# Patient Record
Sex: MALE | Race: BLACK OR AFRICAN AMERICAN | NOT HISPANIC OR LATINO
[De-identification: names, ages, dates, MRNs, and addresses within clinical notes are randomized per-mention and may not be internally consistent; named-entity substitution may affect disease eponyms.]

---

## 2019-10-14 ENCOUNTER — APPOINTMENT (OUTPATIENT)
Dept: THORACIC SURGERY | Facility: CLINIC | Age: 64
End: 2019-10-14
Payer: COMMERCIAL

## 2019-10-14 DIAGNOSIS — F17.290 NICOTINE DEPENDENCE, OTHER TOBACCO PRODUCT, UNCOMPLICATED: ICD-10-CM

## 2019-10-14 DIAGNOSIS — Z87.438 PERSONAL HISTORY OF OTHER DISEASES OF MALE GENITAL ORGANS: ICD-10-CM

## 2019-10-14 DIAGNOSIS — Z86.39 PERSONAL HISTORY OF OTHER ENDOCRINE, NUTRITIONAL AND METABOLIC DISEASE: ICD-10-CM

## 2019-10-14 PROCEDURE — 99204 OFFICE O/P NEW MOD 45 MIN: CPT

## 2019-10-15 VITALS
DIASTOLIC BLOOD PRESSURE: 80 MMHG | BODY MASS INDEX: 25.77 KG/M2 | WEIGHT: 180 LBS | HEIGHT: 70 IN | SYSTOLIC BLOOD PRESSURE: 130 MMHG

## 2019-10-15 PROBLEM — Z87.438 HISTORY OF BENIGN PROSTATIC HYPERPLASIA: Status: RESOLVED | Noted: 2019-10-15 | Resolved: 2019-10-15

## 2019-10-15 PROBLEM — Z86.39 HISTORY OF HYPOTHYROIDISM: Status: RESOLVED | Noted: 2019-10-15 | Resolved: 2019-10-15

## 2019-10-15 PROBLEM — F17.290 CIGAR SMOKER: Status: ACTIVE | Noted: 2019-10-15

## 2019-10-15 RX ORDER — TADALAFIL 5 MG/1
TABLET, FILM COATED ORAL
Refills: 0 | Status: ACTIVE | COMMUNITY

## 2019-10-15 RX ORDER — DUTASTERIDE AND TAMSULOSIN HYDROCHLORIDE .5; .4 MG/1; MG/1
0.5-0.4 CAPSULE ORAL
Refills: 0 | Status: ACTIVE | COMMUNITY

## 2019-10-15 RX ORDER — LEVOTHYROXINE SODIUM 0.17 MG/1
TABLET ORAL
Refills: 0 | Status: ACTIVE | COMMUNITY

## 2019-10-15 NOTE — PHYSICAL EXAM
[General Appearance - In No Acute Distress] : in no acute distress [General Appearance - Alert] : alert [PERRL With Normal Accommodation] : pupils were equal in size, round, and reactive to light [Sclera] : the sclera and conjunctiva were normal [Extraocular Movements] : extraocular movements were intact [Outer Ear] : the ears and nose were normal in appearance [Oropharynx] : the oropharynx was normal [Neck Appearance] : the appearance of the neck was normal [Jugular Venous Distention Increased] : there was no jugular-venous distention [Neck Cervical Mass (___cm)] : no neck mass was observed [Thyroid Diffuse Enlargement] : the thyroid was not enlarged [Thyroid Nodule] : there were no palpable thyroid nodules [Auscultation Breath Sounds / Voice Sounds] : lungs were clear to auscultation bilaterally [Heart Rate And Rhythm] : heart rate was normal and rhythm regular [Heart Sounds] : normal S1 and S2 [Murmurs] : no murmurs [Heart Sounds Gallop] : no gallops [Bowel Sounds] : normal bowel sounds [Heart Sounds Pericardial Friction Rub] : no pericardial rub [Abdomen Soft] : soft [Abdomen Tenderness] : non-tender [Cervical Lymph Nodes Enlarged Posterior Bilaterally] : posterior cervical [Abdomen Mass (___ Cm)] : no abdominal mass palpated [Axillary Lymph Nodes Enlarged Bilaterally] : axillary [Supraclavicular Lymph Nodes Enlarged Bilaterally] : supraclavicular [Cervical Lymph Nodes Enlarged Anterior Bilaterally] : anterior cervical [Femoral Lymph Nodes Enlarged Bilaterally] : femoral [Inguinal Lymph Nodes Enlarged Bilaterally] : inguinal [No Spinal Tenderness] : no spinal tenderness [Abnormal Walk] : normal gait [No CVA Tenderness] : no ~M costovertebral angle tenderness [Musculoskeletal - Swelling] : no joint swelling seen [Nail Clubbing] : no clubbing  or cyanosis of the fingernails [Motor Tone] : muscle strength and tone were normal [Skin Color & Pigmentation] : normal skin color and pigmentation [] : no rash [Deep Tendon Reflexes (DTR)] : deep tendon reflexes were 2+ and symmetric [Skin Turgor] : normal skin turgor [Sensation] : the sensory exam was normal to light touch and pinprick [No Focal Deficits] : no focal deficits [Oriented To Time, Place, And Person] : oriented to person, place, and time [Affect] : the affect was normal [Impaired Insight] : insight and judgment were intact

## 2019-10-17 NOTE — ASSESSMENT
[FreeTextEntry1] : 65 y/o M presents for preop consultation for lumbar fusion and laminectomy with Dr. Chavez and Dr. Newton.  He is a good candidate for surgery and is scheduled for 11/21/19 at [facility].

## 2019-10-17 NOTE — HISTORY OF PRESENT ILLNESS
[FreeTextEntry1] : His is a 63 y/o M presents for preoperative consultation.  He is scheduled to undergo spinal fusion and laminectomy with Dr. Chavez on 11/21/19.  He is here to see Dr. Newton who will be performing anterior exposure during the surgery\par \par

## 2019-11-27 ENCOUNTER — APPOINTMENT (OUTPATIENT)
Dept: THORACIC SURGERY | Facility: HOSPITAL | Age: 64
End: 2019-11-27

## 2019-12-11 ENCOUNTER — APPOINTMENT (OUTPATIENT)
Dept: CARDIOLOGY | Facility: CLINIC | Age: 64
End: 2019-12-11
Payer: COMMERCIAL

## 2019-12-11 ENCOUNTER — NON-APPOINTMENT (OUTPATIENT)
Age: 64
End: 2019-12-11

## 2019-12-11 VITALS
HEIGHT: 70 IN | RESPIRATION RATE: 12 BRPM | OXYGEN SATURATION: 98 % | HEART RATE: 72 BPM | DIASTOLIC BLOOD PRESSURE: 78 MMHG | WEIGHT: 204 LBS | BODY MASS INDEX: 29.2 KG/M2 | SYSTOLIC BLOOD PRESSURE: 130 MMHG

## 2019-12-11 DIAGNOSIS — Z00.00 ENCOUNTER FOR GENERAL ADULT MEDICAL EXAMINATION W/OUT ABNORMAL FINDINGS: ICD-10-CM

## 2019-12-11 DIAGNOSIS — M51.26 OTHER INTERVERTEBRAL DISC DISPLACEMENT, LUMBAR REGION: ICD-10-CM

## 2019-12-11 DIAGNOSIS — Z78.9 OTHER SPECIFIED HEALTH STATUS: ICD-10-CM

## 2019-12-11 DIAGNOSIS — Z80.3 FAMILY HISTORY OF MALIGNANT NEOPLASM OF BREAST: ICD-10-CM

## 2019-12-11 PROCEDURE — 93000 ELECTROCARDIOGRAM COMPLETE: CPT

## 2019-12-11 PROCEDURE — 99214 OFFICE O/P EST MOD 30 MIN: CPT

## 2019-12-11 NOTE — PHYSICAL EXAM
[General Appearance - Well Nourished] : well nourished [General Appearance - Well Developed] : well developed [Normal Conjunctiva] : the conjunctiva exhibited no abnormalities [General Appearance - In No Acute Distress] : no acute distress [] : no respiratory distress [Respiration, Rhythm And Depth] : normal respiratory rhythm and effort [Normal Rate] : normal [Auscultation Breath Sounds / Voice Sounds] : lungs were clear to auscultation bilaterally [Normal S1] : normal S1 [Normal S2] : normal S2 [Rhythm Regular] : regular [No Murmur] : no murmurs heard [2+] : left 2+ [No Pitting Edema] : no pitting edema present [Bowel Sounds] : normal bowel sounds [Nail Clubbing] : no clubbing of the fingernails [Abnormal Walk] : normal gait [Affect] : the affect was normal [Cyanosis, Localized] : no localized cyanosis [Oriented To Time, Place, And Person] : oriented to person, place, and time [Mood] : the mood was normal [FreeTextEntry1] : No JVD present [S3] : no S3 [S4] : no S4 [Right Carotid Bruit] : no bruit heard over the right carotid [Left Carotid Bruit] : no bruit heard over the left carotid

## 2019-12-11 NOTE — ASSESSMENT
[FreeTextEntry1] : 65 yo male with herniated lumbar disc who is currently pending anterior approach lumbar laminectomy on 12/16/19 with Dr. Chavez and Dr. Newton at [facility]. \par \par Patient with good reported exercise capacity and absence of symptoms to suggest cardiac disease.\par Patient does not have any cardiac contraindication to pending surgery.\par Revised cardiac risk index = 0.9% rate of MI or other cardiac complications\par Patient may proceed with surgery with acceptable cardiac risk and without further cardiac work-up or intervention.

## 2019-12-16 ENCOUNTER — APPOINTMENT (OUTPATIENT)
Dept: THORACIC SURGERY | Facility: HOSPITAL | Age: 64
End: 2019-12-16
Payer: COMMERCIAL

## 2019-12-16 PROCEDURE — 22558 ARTHRD ANT NTRBD MIN DSC LUM: CPT

## 2019-12-17 ENCOUNTER — RESULT REVIEW (OUTPATIENT)
Age: 64
End: 2019-12-17

## 2022-12-02 ENCOUNTER — RESULT REVIEW (OUTPATIENT)
Age: 67
End: 2022-12-02

## 2022-12-02 ENCOUNTER — APPOINTMENT (OUTPATIENT)
Dept: HEMATOLOGY ONCOLOGY | Facility: CLINIC | Age: 67
End: 2022-12-02
Payer: COMMERCIAL

## 2022-12-02 VITALS
TEMPERATURE: 97.5 F | OXYGEN SATURATION: 98 % | SYSTOLIC BLOOD PRESSURE: 128 MMHG | DIASTOLIC BLOOD PRESSURE: 78 MMHG | HEIGHT: 70 IN | WEIGHT: 206 LBS | HEART RATE: 69 BPM | BODY MASS INDEX: 29.49 KG/M2 | RESPIRATION RATE: 18 BRPM

## 2022-12-02 DIAGNOSIS — D64.9 ANEMIA, UNSPECIFIED: ICD-10-CM

## 2022-12-02 PROCEDURE — 99204 OFFICE O/P NEW MOD 45 MIN: CPT | Mod: 25

## 2022-12-02 PROCEDURE — 36415 COLL VENOUS BLD VENIPUNCTURE: CPT

## 2022-12-02 RX ORDER — DICLOFENAC SODIUM 50 MG/1
50 TABLET, DELAYED RELEASE ORAL
Qty: 60 | Refills: 0 | Status: ACTIVE | COMMUNITY
Start: 2022-07-14

## 2022-12-02 RX ORDER — DUTASTERIDE 0.5 MG/1
0.5 CAPSULE, LIQUID FILLED ORAL
Qty: 90 | Refills: 0 | Status: ACTIVE | COMMUNITY
Start: 2022-07-15

## 2022-12-02 RX ORDER — OLOPATADINE HCL 1 MG/ML
0.1 SOLUTION/ DROPS OPHTHALMIC
Qty: 5 | Refills: 0 | Status: ACTIVE | COMMUNITY
Start: 2022-11-09

## 2022-12-02 RX ORDER — ALFUZOSIN HYDROCHLORIDE 10 MG/1
10 TABLET, EXTENDED RELEASE ORAL
Qty: 90 | Refills: 0 | Status: ACTIVE | COMMUNITY
Start: 2022-11-28

## 2022-12-02 RX ORDER — CYCLOBENZAPRINE HYDROCHLORIDE 5 MG/1
5 TABLET, FILM COATED ORAL
Qty: 60 | Refills: 0 | Status: ACTIVE | COMMUNITY
Start: 2022-07-14

## 2022-12-05 NOTE — ASSESSMENT
[FreeTextEntry1] : Preop clearance for back surgery\par Plan for L4-L5 surgery with Dr Ryan Chavez\par To be scheduled after clearance\par \par Elevated PTT\par No personal h/o blood clots, CAD, strokes\par No family h/o blood clots or miscarriages\par Outside labs reviewed, analyzed and discussed\par Has had Elevated PTT since 2019 -> s/p ALIF PSIF L5-S1, L3-4 lami on 12/16/19 without complication\par Send blood work including mixing studies, APS, panel, vWD panel, Factor 8,9,11,12\par Clearance depending on findings\par \par Anemia\par Send iron studies\par \par Patient had multiple questions which were answered to satisfaction\par \par Follow up in a few weeks\par No labs

## 2022-12-05 NOTE — HISTORY OF PRESENT ILLNESS
[de-identified] : Mr. Fransico Clay is 67 year old male here for consultation for Elevated ptt and preop clearance\par \par Patient with PMHx of hypothyroidism and lumbar herniated disc who is planning for 2nd  spinal fusion and laminectomy with Dr. Ryan Chavez for Dec 8 (1st was 2018)\par \par 11/21/22 MCV 91.6 H/H 13.7/39.4 \par 11/16/22 H/H 13.4/38.3 MCV 91.2\par . \par Denies any family history of hematological and/or oncological disorders\par \par Social History\par Smokes cigar- rare\par Alcohol - social\par \par FHx: \par Mother with breast cancer at age 56\par Father with unknown malignancy \par \par Screening:\par colonoscopy > 10 yrs ago, over due for another one. \par

## 2022-12-15 NOTE — HISTORY OF PRESENT ILLNESS
[de-identified] : Mr. Fransico Clay is 67 year old male here for consultation for Elevated ptt and preop clearance\par \par Patient with PMHx of hypothyroidism and lumbar herniated disc who is planning for 2nd  spinal fusion and laminectomy with Dr. Ryan Chavez for Dec 8 (1st was 2018)\par \par 11/21/22 MCV 91.6 H/H 13.7/39.4 \par 11/16/22 H/H 13.4/38.3 MCV 91.2\par . \par Denies any family history of hematological and/or oncological disorders\par \par Social History\par Smokes cigar- rare\par Alcohol - social\par \par FHx: \par Mother with breast cancer at age 56\par Father with unknown malignancy \par \par Screening:\par colonoscopy > 10 yrs ago, over due for another one. \par

## 2022-12-16 ENCOUNTER — APPOINTMENT (OUTPATIENT)
Dept: HEMATOLOGY ONCOLOGY | Facility: CLINIC | Age: 67
End: 2022-12-16

## 2022-12-16 VITALS
SYSTOLIC BLOOD PRESSURE: 135 MMHG | DIASTOLIC BLOOD PRESSURE: 74 MMHG | HEIGHT: 70 IN | TEMPERATURE: 97 F | WEIGHT: 203 LBS | OXYGEN SATURATION: 99 % | BODY MASS INDEX: 29.06 KG/M2 | HEART RATE: 59 BPM | RESPIRATION RATE: 16 BRPM

## 2022-12-16 DIAGNOSIS — R79.1 ABNORMAL COAGULATION PROFILE: ICD-10-CM

## 2022-12-16 DIAGNOSIS — Z01.810 ENCOUNTER FOR PREPROCEDURAL CARDIOVASCULAR EXAMINATION: ICD-10-CM

## 2022-12-16 PROCEDURE — 99214 OFFICE O/P EST MOD 30 MIN: CPT | Mod: 25

## 2022-12-16 NOTE — ASSESSMENT
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 to evaluate Elevated PTT\par Explained that it is a very rare disorder which is generally transmitted a s autosomal recessive trait\par The exact incidence of the disorder in the general population is unknown, but estimated to be ~1 in 1 million individuals\par Typically patients with factor 12 deficiency have no bleeding symptoms \par In a study of 14 [place] families with factor XII deficiency, 2 of 18 homozygous or doubly heterozygous patients had sustained deep venous thrombosis; however, each episode occurred at a time when other predisposing thrombotic risk factors were present\par Advised to watch for any signs and symptoms of VTE\par Also advised to have blood relatives do PTT (although this would be more for academic purposes)\par \par Patient had multiple questions which were answered to satisfaction\par \par He will follow up PRN

## 2022-12-16 NOTE — RESULTS/DATA
[FreeTextEntry1] : Labs ordered, drawn in the office, reviewed, analyzed and discussed\par \par PTT Mixing studies- corrected\par Factor 12- 29%

## 2022-12-16 NOTE — HISTORY OF PRESENT ILLNESS
[de-identified] : Mr. Fransico Clay is 67 year old male here for consultation for Elevated ptt and preop clearance\par \par Patient with PMHx of hypothyroidism and lumbar herniated disc who is planning for 2nd  spinal fusion and laminectomy with Dr. Ryan Chavez for Dec 8 (1st was 2018)\par \par 11/21/22 MCV 91.6 H/H 13.7/39.4 \par 11/16/22 H/H 13.4/38.3 MCV 91.2\par . \par Denies any family history of hematological and/or oncological disorders\par \par Social History\par Smokes cigar- rare\par Alcohol - social\par \par FHx: \par Mother with breast cancer at age 56\par Father with unknown malignancy \par \par Screening:\par colonoscopy > 10 yrs ago, over due for another one. \par  [de-identified] : Patient is seen today for follow up and preop clearance\par \par No new symptoms\par

## 2022-12-29 ENCOUNTER — TRANSCRIPTION ENCOUNTER (OUTPATIENT)
Age: 67
End: 2022-12-29

## 2023-01-19 ENCOUNTER — TRANSCRIPTION ENCOUNTER (OUTPATIENT)
Age: 68
End: 2023-01-19

## 2023-06-30 ENCOUNTER — NON-APPOINTMENT (OUTPATIENT)
Age: 68
End: 2023-06-30

## 2023-06-30 ENCOUNTER — APPOINTMENT (OUTPATIENT)
Dept: GASTROENTEROLOGY | Facility: CLINIC | Age: 68
End: 2023-06-30
Payer: COMMERCIAL

## 2023-06-30 VITALS
DIASTOLIC BLOOD PRESSURE: 80 MMHG | WEIGHT: 193 LBS | HEIGHT: 69 IN | BODY MASS INDEX: 28.58 KG/M2 | SYSTOLIC BLOOD PRESSURE: 120 MMHG

## 2023-06-30 DIAGNOSIS — Z12.11 ENCOUNTER FOR SCREENING FOR MALIGNANT NEOPLASM OF COLON: ICD-10-CM

## 2023-06-30 PROCEDURE — 99203 OFFICE O/P NEW LOW 30 MIN: CPT

## 2023-06-30 RX ORDER — DAPAGLIFLOZIN 5 MG/1
5 TABLET, FILM COATED ORAL
Qty: 30 | Refills: 0 | Status: DISCONTINUED | COMMUNITY
Start: 2022-11-21 | End: 2023-06-30

## 2023-06-30 NOTE — ASSESSMENT
[FreeTextEntry1] : Colon cancer screening:  Colonoscopy scheduled\par \par Pertinent available records reviewed\par Risks of the procedure including but not limited to bleeding / perforation / infection / anesthesia complication / missed polyp or lesion explained to the  patient . The patient expressed understanding and a desire to proceed with the procedure.\par \par Risk of not doing procedure includes but is not limited to missed or delayed diagnosis of gastrointestinal pathology.\par A consultation note was provided to the referring provider\par

## 2023-06-30 NOTE — HISTORY OF PRESENT ILLNESS
[FreeTextEntry1] : FLACA FOY  is being evaluated at the request of Dr. Arriaga for an opinion re: colon cancer screening. Denies nausea, vomiting, fever, chills diarrhea, constipation, melena, hematemesis, BRBPR, unexpected weight loss\par \par -EGD / colonoscopy 7/2014: gastritis / hemorrhoids / diverticulosis\par

## 2023-06-30 NOTE — PHYSICAL EXAM
[Alert] : alert [Sclera] : the sclera and conjunctiva were normal [No Respiratory Distress] : no respiratory distress [None] : no edema [Abdomen Soft] : soft [No CVA Tenderness] : no CVA  tenderness [Abnormal Walk] : normal gait [No Focal Deficits] : no focal deficits [Oriented To Time, Place, And Person] : oriented to person, place, and time [de-identified] : Deferred pending colonoscopy

## 2023-07-27 ENCOUNTER — RESULT REVIEW (OUTPATIENT)
Age: 68
End: 2023-07-27

## 2023-07-28 ENCOUNTER — APPOINTMENT (OUTPATIENT)
Dept: GASTROENTEROLOGY | Facility: HOSPITAL | Age: 68
End: 2023-07-28

## 2024-11-15 ENCOUNTER — NON-APPOINTMENT (OUTPATIENT)
Age: 69
End: 2024-11-15

## 2024-11-15 ENCOUNTER — APPOINTMENT (OUTPATIENT)
Dept: FAMILY MEDICINE | Facility: CLINIC | Age: 69
End: 2024-11-15
Payer: MEDICARE

## 2024-11-15 ENCOUNTER — LABORATORY RESULT (OUTPATIENT)
Age: 69
End: 2024-11-15

## 2024-11-15 VITALS
HEIGHT: 69 IN | HEART RATE: 61 BPM | SYSTOLIC BLOOD PRESSURE: 126 MMHG | BODY MASS INDEX: 28.73 KG/M2 | OXYGEN SATURATION: 98 % | DIASTOLIC BLOOD PRESSURE: 80 MMHG | WEIGHT: 194 LBS | TEMPERATURE: 98.2 F

## 2024-11-15 DIAGNOSIS — R53.83 OTHER FATIGUE: ICD-10-CM

## 2024-11-15 DIAGNOSIS — Z00.00 ENCOUNTER FOR GENERAL ADULT MEDICAL EXAMINATION W/OUT ABNORMAL FINDINGS: ICD-10-CM

## 2024-11-15 DIAGNOSIS — Z13.220 ENCOUNTER FOR SCREENING FOR LIPOID DISORDERS: ICD-10-CM

## 2024-11-15 DIAGNOSIS — R73.09 OTHER ABNORMAL GLUCOSE: ICD-10-CM

## 2024-11-15 DIAGNOSIS — E03.9 HYPOTHYROIDISM, UNSPECIFIED: ICD-10-CM

## 2024-11-15 PROCEDURE — G0402 INITIAL PREVENTIVE EXAM: CPT

## 2024-11-15 PROCEDURE — G0438: CPT

## 2024-11-15 RX ORDER — LEVOTHYROXINE SODIUM 200 UG/1
200 TABLET ORAL DAILY
Qty: 90 | Refills: 3 | Status: ACTIVE | COMMUNITY
Start: 2024-11-15 | End: 1900-01-01

## 2024-11-18 DIAGNOSIS — E11.9 TYPE 2 DIABETES MELLITUS W/OUT COMPLICATIONS: ICD-10-CM

## 2024-11-18 DIAGNOSIS — R73.03 PREDIABETES.: ICD-10-CM

## 2024-11-18 LAB
ALBUMIN SERPL ELPH-MCNC: 4.7 G/DL
ALP BLD-CCNC: 75 U/L
ALT SERPL-CCNC: 21 U/L
ANION GAP SERPL CALC-SCNC: 14 MMOL/L
AST SERPL-CCNC: 28 U/L
BASOPHILS # BLD AUTO: 0.02 K/UL
BASOPHILS NFR BLD AUTO: 0.4 %
BILIRUB SERPL-MCNC: 0.3 MG/DL
BUN SERPL-MCNC: 13 MG/DL
CALCIUM SERPL-MCNC: 9.7 MG/DL
CHLORIDE SERPL-SCNC: 107 MMOL/L
CHOLEST SERPL-MCNC: 177 MG/DL
CO2 SERPL-SCNC: 25 MMOL/L
CREAT SERPL-MCNC: 1.15 MG/DL
EGFR: 69 ML/MIN/1.73M2
EOSINOPHIL # BLD AUTO: 0.07 K/UL
EOSINOPHIL NFR BLD AUTO: 1.4 %
ESTIMATED AVERAGE GLUCOSE: 154 MG/DL
GLUCOSE SERPL-MCNC: 135 MG/DL
HBA1C MFR BLD HPLC: 7 %
HCT VFR BLD CALC: 43.2 %
HDLC SERPL-MCNC: 69 MG/DL
HGB BLD-MCNC: 14.5 G/DL
IMM GRANULOCYTES NFR BLD AUTO: 0.2 %
LDLC SERPL CALC-MCNC: 88 MG/DL
LYMPHOCYTES # BLD AUTO: 1.58 K/UL
LYMPHOCYTES NFR BLD AUTO: 32 %
MAN DIFF?: NORMAL
MCHC RBC-ENTMCNC: 31.8 PG
MCHC RBC-ENTMCNC: 33.6 G/DL
MCV RBC AUTO: 94.7 FL
MONOCYTES # BLD AUTO: 0.45 K/UL
MONOCYTES NFR BLD AUTO: 9.1 %
NEUTROPHILS # BLD AUTO: 2.81 K/UL
NEUTROPHILS NFR BLD AUTO: 56.9 %
NONHDLC SERPL-MCNC: 108 MG/DL
PLATELET # BLD AUTO: 198 K/UL
POTASSIUM SERPL-SCNC: 5.3 MMOL/L
PROT SERPL-MCNC: 7.4 G/DL
RBC # BLD: 4.56 M/UL
RBC # FLD: 13.4 %
SODIUM SERPL-SCNC: 145 MMOL/L
TRIGL SERPL-MCNC: 113 MG/DL
TSH SERPL-ACNC: 0.18 UIU/ML
WBC # FLD AUTO: 4.94 K/UL

## 2025-01-03 ENCOUNTER — APPOINTMENT (OUTPATIENT)
Dept: FAMILY MEDICINE | Facility: CLINIC | Age: 70
End: 2025-01-03
Payer: MEDICARE

## 2025-01-03 ENCOUNTER — RESULT REVIEW (OUTPATIENT)
Age: 70
End: 2025-01-03

## 2025-01-03 VITALS
BODY MASS INDEX: 28.58 KG/M2 | HEART RATE: 97 BPM | DIASTOLIC BLOOD PRESSURE: 60 MMHG | WEIGHT: 193 LBS | OXYGEN SATURATION: 92 % | HEIGHT: 69 IN | SYSTOLIC BLOOD PRESSURE: 120 MMHG | TEMPERATURE: 97.3 F

## 2025-01-03 DIAGNOSIS — R06.02 SHORTNESS OF BREATH: ICD-10-CM

## 2025-01-03 DIAGNOSIS — J40 BRONCHITIS, NOT SPECIFIED AS ACUTE OR CHRONIC: ICD-10-CM

## 2025-01-03 PROCEDURE — 99214 OFFICE O/P EST MOD 30 MIN: CPT

## 2025-01-03 PROCEDURE — 99204 OFFICE O/P NEW MOD 45 MIN: CPT

## 2025-01-03 PROCEDURE — G2211 COMPLEX E/M VISIT ADD ON: CPT

## 2025-01-03 RX ORDER — BENZONATATE 200 MG/1
200 CAPSULE ORAL 3 TIMES DAILY
Qty: 20 | Refills: 0 | Status: ACTIVE | COMMUNITY
Start: 2025-01-03 | End: 1900-01-01

## 2025-01-03 RX ORDER — AZITHROMYCIN 250 MG/1
250 TABLET, FILM COATED ORAL
Qty: 1 | Refills: 0 | Status: ACTIVE | COMMUNITY
Start: 2025-01-03 | End: 1900-01-01

## 2025-01-22 ENCOUNTER — APPOINTMENT (OUTPATIENT)
Dept: FAMILY MEDICINE | Facility: CLINIC | Age: 70
End: 2025-01-22
Payer: MEDICARE

## 2025-01-22 VITALS
TEMPERATURE: 98.2 F | HEIGHT: 69 IN | BODY MASS INDEX: 28.58 KG/M2 | WEIGHT: 193 LBS | HEART RATE: 65 BPM | DIASTOLIC BLOOD PRESSURE: 76 MMHG | OXYGEN SATURATION: 97 % | SYSTOLIC BLOOD PRESSURE: 112 MMHG

## 2025-01-22 DIAGNOSIS — E78.5 HYPERLIPIDEMIA, UNSPECIFIED: ICD-10-CM

## 2025-01-22 DIAGNOSIS — E11.9 TYPE 2 DIABETES MELLITUS W/OUT COMPLICATIONS: ICD-10-CM

## 2025-01-22 DIAGNOSIS — E03.9 HYPOTHYROIDISM, UNSPECIFIED: ICD-10-CM

## 2025-01-22 PROCEDURE — G0537: CPT

## 2025-01-22 PROCEDURE — 99214 OFFICE O/P EST MOD 30 MIN: CPT

## 2025-01-22 PROCEDURE — G2211 COMPLEX E/M VISIT ADD ON: CPT

## 2025-01-22 PROCEDURE — G0444 DEPRESSION SCREEN ANNUAL: CPT | Mod: 59

## 2025-01-23 LAB
ESTIMATED AVERAGE GLUCOSE: 160 MG/DL
HBA1C MFR BLD HPLC: 7.2 %
T4 FREE SERPL-MCNC: 1.7 NG/DL
TSH SERPL-ACNC: 0.03 UIU/ML

## 2025-01-23 RX ORDER — METFORMIN HYDROCHLORIDE 500 MG/1
500 TABLET, COATED ORAL TWICE DAILY
Qty: 360 | Refills: 3 | Status: ACTIVE | COMMUNITY
Start: 2025-01-23 | End: 1900-01-01

## 2025-01-23 RX ORDER — LEVOTHYROXINE SODIUM 0.15 MG/1
150 TABLET ORAL DAILY
Qty: 90 | Refills: 3 | Status: ACTIVE | COMMUNITY
Start: 2025-01-23 | End: 1900-01-01

## 2025-03-21 ENCOUNTER — APPOINTMENT (OUTPATIENT)
Dept: ORTHOPEDIC SURGERY | Facility: CLINIC | Age: 70
End: 2025-03-21
Payer: MEDICARE

## 2025-03-21 VITALS
BODY MASS INDEX: 28.58 KG/M2 | WEIGHT: 193 LBS | HEART RATE: 66 BPM | HEIGHT: 69 IN | OXYGEN SATURATION: 100 % | DIASTOLIC BLOOD PRESSURE: 72 MMHG | SYSTOLIC BLOOD PRESSURE: 132 MMHG

## 2025-03-21 DIAGNOSIS — R10.2 PELVIC AND PERINEAL PAIN: ICD-10-CM

## 2025-03-21 DIAGNOSIS — M54.16 RADICULOPATHY, LUMBAR REGION: ICD-10-CM

## 2025-03-21 PROCEDURE — 99204 OFFICE O/P NEW MOD 45 MIN: CPT

## 2025-03-21 PROCEDURE — G2211 COMPLEX E/M VISIT ADD ON: CPT

## 2025-03-21 PROCEDURE — 72110 X-RAY EXAM L-2 SPINE 4/>VWS: CPT

## 2025-04-03 ENCOUNTER — NON-APPOINTMENT (OUTPATIENT)
Age: 70
End: 2025-04-03

## 2025-04-16 ENCOUNTER — APPOINTMENT (OUTPATIENT)
Dept: ORTHOPEDIC SURGERY | Facility: CLINIC | Age: 70
End: 2025-04-16
Payer: MEDICARE

## 2025-04-16 VITALS
BODY MASS INDEX: 28.88 KG/M2 | HEIGHT: 69 IN | SYSTOLIC BLOOD PRESSURE: 129 MMHG | DIASTOLIC BLOOD PRESSURE: 72 MMHG | HEART RATE: 72 BPM | WEIGHT: 195 LBS | OXYGEN SATURATION: 98 %

## 2025-04-16 DIAGNOSIS — M54.16 RADICULOPATHY, LUMBAR REGION: ICD-10-CM

## 2025-04-16 PROCEDURE — 99214 OFFICE O/P EST MOD 30 MIN: CPT

## 2025-04-16 PROCEDURE — G2211 COMPLEX E/M VISIT ADD ON: CPT

## 2025-04-17 ENCOUNTER — APPOINTMENT (OUTPATIENT)
Dept: ORTHOPEDIC SURGERY | Facility: CLINIC | Age: 70
End: 2025-04-17
Payer: MEDICARE

## 2025-04-17 VITALS
HEIGHT: 69 IN | BODY MASS INDEX: 28.88 KG/M2 | WEIGHT: 195 LBS | OXYGEN SATURATION: 96 % | HEART RATE: 86 BPM | SYSTOLIC BLOOD PRESSURE: 113 MMHG | DIASTOLIC BLOOD PRESSURE: 61 MMHG

## 2025-04-17 DIAGNOSIS — M47.816 SPONDYLOSIS W/OUT MYELOPATHY OR RADICULOPATHY, LUMBAR REGION: ICD-10-CM

## 2025-04-17 DIAGNOSIS — M70.61 TROCHANTERIC BURSITIS, RIGHT HIP: ICD-10-CM

## 2025-04-17 DIAGNOSIS — M16.11 UNILATERAL PRIMARY OSTEOARTHRITIS, RIGHT HIP: ICD-10-CM

## 2025-04-17 PROCEDURE — J3490M: CUSTOM | Mod: NC

## 2025-04-17 PROCEDURE — 20611 DRAIN/INJ JOINT/BURSA W/US: CPT | Mod: RT

## 2025-04-17 PROCEDURE — 99203 OFFICE O/P NEW LOW 30 MIN: CPT | Mod: 25

## 2025-05-09 ENCOUNTER — APPOINTMENT (OUTPATIENT)
Dept: ORTHOPEDIC SURGERY | Facility: CLINIC | Age: 70
End: 2025-05-09

## 2025-06-19 ENCOUNTER — APPOINTMENT (OUTPATIENT)
Dept: ORTHOPEDIC SURGERY | Facility: CLINIC | Age: 70
End: 2025-06-19
Payer: MEDICARE

## 2025-06-19 VITALS
HEART RATE: 78 BPM | RESPIRATION RATE: 16 BRPM | TEMPERATURE: 97.3 F | OXYGEN SATURATION: 99 % | HEIGHT: 69 IN | DIASTOLIC BLOOD PRESSURE: 70 MMHG | SYSTOLIC BLOOD PRESSURE: 125 MMHG | WEIGHT: 195 LBS | BODY MASS INDEX: 28.88 KG/M2

## 2025-06-19 PROCEDURE — 99213 OFFICE O/P EST LOW 20 MIN: CPT

## 2025-06-25 ENCOUNTER — APPOINTMENT (OUTPATIENT)
Dept: ORTHOPEDIC SURGERY | Facility: CLINIC | Age: 70
End: 2025-06-25
Payer: MEDICARE

## 2025-06-25 VITALS
HEART RATE: 77 BPM | BODY MASS INDEX: 28.88 KG/M2 | DIASTOLIC BLOOD PRESSURE: 62 MMHG | HEIGHT: 69 IN | SYSTOLIC BLOOD PRESSURE: 114 MMHG | OXYGEN SATURATION: 98 % | WEIGHT: 195 LBS

## 2025-06-25 PROCEDURE — 99214 OFFICE O/P EST MOD 30 MIN: CPT

## 2025-06-25 PROCEDURE — G2211 COMPLEX E/M VISIT ADD ON: CPT

## 2025-07-07 ENCOUNTER — APPOINTMENT (OUTPATIENT)
Dept: FAMILY MEDICINE | Facility: CLINIC | Age: 70
End: 2025-07-07